# Patient Record
Sex: FEMALE | HISPANIC OR LATINO | Employment: UNEMPLOYED | URBAN - METROPOLITAN AREA
[De-identification: names, ages, dates, MRNs, and addresses within clinical notes are randomized per-mention and may not be internally consistent; named-entity substitution may affect disease eponyms.]

---

## 2023-08-30 ENCOUNTER — APPOINTMENT (OUTPATIENT)
Dept: LAB | Facility: CLINIC | Age: 28
End: 2023-08-30

## 2023-08-30 DIAGNOSIS — Z02.1 PRE-EMPLOYMENT HEALTH SCREENING EXAMINATION: ICD-10-CM

## 2023-08-30 LAB
MEV IGG SER QL IA: NORMAL
MUV IGG SER QL IA: NORMAL
RUBV IGG SERPL IA-ACNC: 48.5 IU/ML
VZV IGG SER QL IA: NORMAL

## 2023-08-30 PROCEDURE — 86787 VARICELLA-ZOSTER ANTIBODY: CPT

## 2023-08-30 PROCEDURE — 36415 COLL VENOUS BLD VENIPUNCTURE: CPT

## 2023-08-30 PROCEDURE — 86480 TB TEST CELL IMMUN MEASURE: CPT

## 2023-08-30 PROCEDURE — 86735 MUMPS ANTIBODY: CPT

## 2023-08-30 PROCEDURE — 86762 RUBELLA ANTIBODY: CPT

## 2023-08-30 PROCEDURE — 86765 RUBEOLA ANTIBODY: CPT

## 2023-09-01 LAB
GAMMA INTERFERON BACKGROUND BLD IA-ACNC: 0.07 IU/ML
M TB IFN-G BLD-IMP: NEGATIVE
M TB IFN-G CD4+ BCKGRND COR BLD-ACNC: -0.03 IU/ML
M TB IFN-G CD4+ BCKGRND COR BLD-ACNC: -0.03 IU/ML
MITOGEN IGNF BCKGRD COR BLD-ACNC: >10 IU/ML

## 2024-04-19 ENCOUNTER — OFFICE VISIT (OUTPATIENT)
Dept: URGENT CARE | Facility: CLINIC | Age: 29
End: 2024-04-19
Payer: COMMERCIAL

## 2024-04-19 VITALS
OXYGEN SATURATION: 100 % | RESPIRATION RATE: 18 BRPM | HEART RATE: 102 BPM | TEMPERATURE: 98.8 F | DIASTOLIC BLOOD PRESSURE: 80 MMHG | WEIGHT: 225 LBS | SYSTOLIC BLOOD PRESSURE: 138 MMHG

## 2024-04-19 DIAGNOSIS — R10.9 RIGHT SIDED ABDOMINAL PAIN: Primary | ICD-10-CM

## 2024-04-19 PROCEDURE — 99213 OFFICE O/P EST LOW 20 MIN: CPT | Performed by: PHYSICIAN ASSISTANT

## 2024-04-19 RX ORDER — RIZATRIPTAN BENZOATE 10 MG/1
TABLET ORAL
COMMUNITY
Start: 2024-03-07

## 2024-04-19 RX ORDER — DROSPIRENONE AND ETHINYL ESTRADIOL 0.03MG-3MG
KIT ORAL
COMMUNITY
Start: 2024-03-31

## 2024-04-19 NOTE — PATIENT INSTRUCTIONS
Right sided abdominal pain: The patient is having significant discomfort and has abdominal tenderness over the RUQ and RLQ.  We discussed ddx included appendicitis, cholecystitis, Nephrolithiasis. I would like her to go to the ED immediately for a CT Scan. She is agreeable and would like her  to drive her to [facility]. The patients vitals are stable. The ED was called and report was given.

## 2024-04-19 NOTE — PROGRESS NOTES
[facility]        NAME: Radha Thomas is a 28 y.o. female  : 1995    MRN: [id number]  DATE: 2024  TIME: 12:41 PM    Assessment and Plan   Right sided abdominal pain [R10.9]  1. Right sided abdominal pain              Patient Instructions   Right sided abdominal pain: The patient is having significant discomfort and has abdominal tenderness over the RUQ and RLQ.  We discussed ddx included appendicitis, cholecystitis, Nephrolithiasis. I would like her to go to the ED immediately for a CT Scan. She is agreeable and would like her  to drive her to [facility]. The patients vitals are stable. The ED was called and report was given.     Follow up with PCP in 3-5 days.  Proceed to  ER     If tests have been performed at [facility], our office will contact you with results if changes need to be made to the care plan discussed with you at the visit.  You can review your full results on [facility] MyChart.    Chief Complaint     Chief Complaint   Patient presents with    Abdominal Pain     Pt here  for right side  Abd pain on going  since 7 Am,    pain 7/10, some nausea.  HX of PCOS.          History of Present Illness       The patient is a 28-year-old female with a hx of PCOS who presents today for right sided abdominal pain with nausea x 5 hours. She states that her sx began last night with mild nausea, she went to work today and began to experience acute, severe pain over the Right lower quadrant that radiates around to the back. She states that the pain will come in waves. No hx of abdominal surgeries. She states that PO intake makes it worse. She has no diarrhea, vomiting, melena, hematochezia. No dizziness, weakness, cp, palpitations. She has no hematuria, urinary frequency, urgency. LMP: 4/10/24.         Review of Systems   Review of Systems   Constitutional:  Negative for activity change, appetite change, chills, fatigue and fever.   Respiratory:  Negative for cough,  chest tightness, shortness of breath and wheezing.    Cardiovascular:  Negative for chest pain and palpitations.   Gastrointestinal:  Positive for abdominal pain and nausea. Negative for abdominal distention, blood in stool, constipation, diarrhea and vomiting.   Genitourinary:  Negative for decreased urine volume, dysuria, flank pain, frequency, hematuria, menstrual problem, pelvic pain, urgency, vaginal bleeding, vaginal discharge and vaginal pain.   Musculoskeletal:  Negative for arthralgias and myalgias.   Skin:  Negative for rash.   Neurological:  Negative for dizziness and weakness.   Hematological:  Negative for adenopathy. Does not bruise/bleed easily.         Current Medications       Current Outpatient Medications:     drospirenone-ethinyl estradiol (LEONARDO) 3-0.03 MG per tablet, , Disp: , Rfl:     rizatriptan (MAXALT) 10 mg tablet, , Disp: , Rfl:     Current Allergies     Allergies as of 04/19/2024    (No Known Allergies)            The following portions of the patient's history were reviewed and updated as appropriate: allergies, current medications, past family history, past medical history, past social history, past surgical history and problem list.     Past Medical History:   Diagnosis Date    PCOS (polycystic ovarian syndrome)        Past Surgical History:   Procedure Laterality Date    WISDOM TOOTH EXTRACTION         Family History   Problem Relation Age of Onset    Seizures Father          Medications have been verified.        Objective   /80   Pulse 102   Temp 98.8 °F (37.1 °C)   Resp 18   Wt 102 kg (225 lb)   SpO2 100%   No LMP recorded.       Physical Exam     Physical Exam  Vitals and nursing note reviewed.   Constitutional:       General: She is in acute distress.      Appearance: Normal appearance. She is well-developed. She is not ill-appearing, toxic-appearing or diaphoretic.      Comments: The patient is in acute distress due to pain and is unable to stay still.     Cardiovascular:      Rate and Rhythm: Normal rate and regular rhythm. No extrasystoles are present.     Chest Wall: PMI is not displaced. No thrill.      Pulses: No decreased pulses.      Heart sounds: Normal heart sounds, S1 normal and S2 normal.      No friction rub. No gallop. No S3 or S4 sounds.   Pulmonary:      Effort: Pulmonary effort is normal.      Breath sounds: Normal breath sounds and air entry. No stridor, decreased air movement or transmitted upper airway sounds. No decreased breath sounds, wheezing, rhonchi or rales.   Abdominal:      General: Abdomen is flat. Bowel sounds are normal. There is no distension.      Palpations: Abdomen is soft. Abdomen is not rigid.      Tenderness: There is abdominal tenderness in the right upper quadrant and right lower quadrant. There is guarding. There is no right CVA tenderness, left CVA tenderness or rebound. Positive signs include Spicer's sign. Negative signs include Rovsing's sign, McBurney's sign and obturator sign.      Hernia: No hernia is present.      Comments: There is significant RUQ and RLQ tenderness on exam today with guarding. Brooks sign positive. Abdomen is soft and non-distended. Bowel sounds are normoactive in all four quadrants. No CVA tenderness.    Skin:     General: Skin is warm and dry.      Capillary Refill: Capillary refill takes less than 2 seconds.   Psychiatric:         Behavior: Behavior normal.

## 2024-04-26 ENCOUNTER — TELEPHONE (OUTPATIENT)
Age: 29
End: 2024-04-26

## 2024-04-26 NOTE — TELEPHONE ENCOUNTER
Npt called stating she's colleague Jaylyn Salazar PA-C asking if she could be seen asap she had presented [facility] [place] and [facility] ED was instructed to follow up which she's prefer seeing Jaylyn than going to [facility] [place],please forward request.

## 2024-04-29 NOTE — TELEPHONE ENCOUNTER
Pt called back and stated that she will take the appt on 5/2 at 1:40 at [facility] with Jaylyn Patrick she will schedule the appt since I don't have an access to do it.

## 2024-04-29 NOTE — TELEPHONE ENCOUNTER
Voicemail left for the patient stating Jaylyn GLASER can see her on 5/2/2024 at 140 pm at [facility] office.  Asked patient to please call the office to confirm.    Please let me know so I can schedule patient on Jaylyn's schedule.  Thanks

## 2024-04-29 NOTE — TELEPHONE ENCOUNTER
thanks yes i'm happy to see Radha personally- please see if she can come in this week can double book my 1:40 this thursday 5/2

## 2024-05-02 ENCOUNTER — OFFICE VISIT (OUTPATIENT)
Dept: UROLOGY | Facility: CLINIC | Age: 29
End: 2024-05-02
Payer: COMMERCIAL

## 2024-05-02 VITALS
SYSTOLIC BLOOD PRESSURE: 116 MMHG | HEART RATE: 82 BPM | WEIGHT: 220 LBS | DIASTOLIC BLOOD PRESSURE: 68 MMHG | OXYGEN SATURATION: 99 %

## 2024-05-02 DIAGNOSIS — R10.31 RIGHT LOWER QUADRANT PAIN: Primary | ICD-10-CM

## 2024-05-02 DIAGNOSIS — R10.9 FLANK PAIN: ICD-10-CM

## 2024-05-02 LAB
POST-VOID RESIDUAL VOLUME, ML POC: 52 ML
SL AMB  POCT GLUCOSE, UA: ABNORMAL
SL AMB LEUKOCYTE ESTERASE,UA: ABNORMAL
SL AMB POCT BILIRUBIN,UA: ABNORMAL
SL AMB POCT BLOOD,UA: ABNORMAL
SL AMB POCT CLARITY,UA: ABNORMAL
SL AMB POCT COLOR,UA: YELLOW
SL AMB POCT KETONES,UA: ABNORMAL
SL AMB POCT NITRITE,UA: ABNORMAL
SL AMB POCT PH,UA: 5
SL AMB POCT SPECIFIC GRAVITY,UA: 1.01
SL AMB POCT URINE PROTEIN: ABNORMAL
SL AMB POCT UROBILINOGEN: 0.2

## 2024-05-02 PROCEDURE — 51798 US URINE CAPACITY MEASURE: CPT | Performed by: PHYSICIAN ASSISTANT

## 2024-05-02 PROCEDURE — 81002 URINALYSIS NONAUTO W/O SCOPE: CPT | Performed by: PHYSICIAN ASSISTANT

## 2024-05-02 PROCEDURE — 99203 OFFICE O/P NEW LOW 30 MIN: CPT | Performed by: PHYSICIAN ASSISTANT

## 2024-05-02 NOTE — PROGRESS NOTES
5/2/2024      Chief Complaint   Patient presents with   • New Patient Visit     RLQ pain and flank pain          Assessment and Plan    28 y.o. female managed by     1. Right lower quadrant pain  -     POCT urine dip  -     POCT Measure PVR    2. Flank pain  -     POCT urine dip  -     POCT Measure PVR      Reviewed her ER and primary records with her today.  2 CAT scans, urine testing.  She is managing her symptoms with ibuprofen, she already completed a course of Bactrim, nausea vomiting diarrhea has resolved.  Unclear cause of her acute GI and  symptoms.  I question if this is part of a viral syndrome.  She seems to be at the tail end of symptoms improving with conservative measures.  I do not think that this represents UTI nor stone.  She will let me know how she is feeling in the next few weeks.      History of Present Illness  Radha Thomas is a 28 y.o. female here for evaluation of abdominal pain.  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    2 weeks of acute right flank pain radiating to the right lower quadrant initially accompanied by nausea and vomiting.  Diarrhea ensued and was pretty severe for a week after that.  GI symptoms have subsided.  Still gets sharp pains into the right flank at times possibly radiates toward her bladder, has no hematuria dysuria urgency.  Pain seems unrelated by food or urination.  She has no appetite has been trying brat diet.  Initial CT scan in the emergency department showed no stone no hydronephrosis no appendicitis no ovarian concerns.  There were dilated bowel loops more consistent with an enteritis.  She was prescribed Zofran and later Bactrim for some trace blood and leukocytes on urinalysis-which she completed.  Culture was negative.  She had a follow-up CT stone study which again showed no stone or hydronephrosis.  There is some general lymphadenopathy I question if this is all part of a viral syndrome.        Review of Systems    Constitutional: Negative.    Respiratory: Negative.     Cardiovascular: Negative.    Gastrointestinal:  Positive for abdominal pain, diarrhea, nausea and vomiting. Negative for abdominal distention and constipation.   Genitourinary:  Positive for flank pain. Negative for decreased urine volume, difficulty urinating, dysuria, frequency, hematuria and urgency.                Vitals  Vitals:    05/02/24 1353   BP: 116/68   BP Location: Left arm   Patient Position: Sitting   Cuff Size: Large   Pulse: 82   SpO2: 99%   Weight: 99.8 kg (220 lb)       Physical Exam  Vitals and nursing note reviewed.   Constitutional:       General: She is not in acute distress.     Appearance: Normal appearance. She is well-developed. She is not diaphoretic.   HENT:      Head: Normocephalic and atraumatic.   Pulmonary:      Effort: Pulmonary effort is normal.   Abdominal:      General: Abdomen is flat.      Palpations: Abdomen is soft. There is no mass.      Tenderness: There is no abdominal tenderness. There is no right CVA tenderness or left CVA tenderness.   Musculoskeletal:      Right lower leg: No edema.      Left lower leg: No edema.   Skin:     General: Skin is warm and dry.   Neurological:      General: No focal deficit present.      Mental Status: She is alert and oriented to person, place, and time.   Psychiatric:         Mood and Affect: Mood normal.         Speech: Speech normal.         Behavior: Behavior normal.           Past History  Past Medical History:   Diagnosis Date   • PCOS (polycystic ovarian syndrome)      Social History     Socioeconomic History   • Marital status: /Civil Union     Spouse name: None   • Number of children: None   • Years of education: None   • Highest education level: None   Occupational History   • None   Tobacco Use   • Smoking status: Never   • Smokeless tobacco: Never   Vaping Use   • Vaping status: Never Used   Substance and Sexual Activity   • Alcohol use: Yes   • Drug use: Not  "Currently   • Sexual activity: None   Other Topics Concern   • None   Social History Narrative   • None     Social Determinants of Health     Financial Resource Strain: Not on file   Food Insecurity: Not on file   Transportation Needs: Not on file   Physical Activity: Not on file   Stress: Not on file   Social Connections: Not on file   Intimate Partner Violence: Not on file   Housing Stability: Not on file     Social History     Tobacco Use   Smoking Status Never   Smokeless Tobacco Never     Family History   Problem Relation Age of Onset   • Seizures Father        The following portions of the patient's history were reviewed and updated as appropriate: allergies, current medications, past medical history, past social history, past surgical history and problem list.    Results  No results found for this or any previous visit (from the past 1 hour(s)).  ]  No results found for: \"PSA\"  No results found for: \"GLUCOSE\", \"CALCIUM\", \"NA\", \"K\", \"CO2\", \"CL\", \"BUN\", \"CREATININE\"  No results found for: \"WBC\", \"HGB\", \"HCT\", \"MCV\", \"PLT\"    "

## 2024-10-30 ENCOUNTER — TELEPHONE (OUTPATIENT)
Age: 29
End: 2024-10-30

## 2024-10-30 NOTE — TELEPHONE ENCOUNTER
Patient returning call and only can go to [facility] office  She will call back to check on the schedule to see if we get any availability

## 2024-12-04 ENCOUNTER — DOCUMENTATION (OUTPATIENT)
Dept: DERMATOLOGY | Facility: CLINIC | Age: 29
End: 2024-12-04

## 2024-12-04 DIAGNOSIS — L81.1 MELASMA: Primary | ICD-10-CM

## 2024-12-04 RX ORDER — HYDROQUINONE 40 MG/G
CREAM TOPICAL
Qty: 30 G | Refills: 3 | Status: SHIPPED | OUTPATIENT
Start: 2024-12-04

## 2024-12-04 RX ORDER — TRETINOIN 0.25 MG/G
CREAM TOPICAL
Qty: 30 G | Refills: 3 | Status: SHIPPED | OUTPATIENT
Start: 2024-12-04

## 2024-12-04 NOTE — PROGRESS NOTES
Seen at [facility], sent in Rx for HQ and tranexemic acid tretinoin containing creams to alternate every two months for melasma.

## 2025-01-10 NOTE — PROGRESS NOTES
"Subjective      Radha Thomas is a 29 y.o. female who presents for annual GYN exam.     GYN:  Menses are getting more regular! They last for 5-6 days. She reports that her periods were really rough after discontinuing OCPs (heavy and irregular) but improving now!  However, they have regulated more in the past 3 months. BUT, her period is now 1 week late and UPT was negative in the office today. She has a history of issues with her periods. She was on birth control from a young age and then discontinued but did not get her cycle for 1 year. She was diagnosed with PCOS by ultrasound previously (cysts on both ovaries). She was subsequently restarted on OCPs  She is transferring care from [facility] to 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   She denies vaginal discharge, labial erythema or lesions, dyspareulenia.  Contraception: None currenlty; They are not preventing pregnancy but are also not actively trying. \"If it happens, it happens\"   Patient is sexually active with her .     OB:  OB History    Para Term  AB Living   0 0 0 0 0 0   SAB IAB Ectopic Multiple Live Births   0 0 0 0 0     :  She denies dysuria, urinary frequency or urgency.  She denies hematuria, flank pain, incontinence.    Breast:  She denie breast mass, skin changes, dimpling, reddening, nipple retraction.  She denies breast discharge.  She had significant left breast pain a few months ago. She completed an ultrasound that was normal and her pain has resolved!  Patient does not have a family history of breast, endometrial, colon, or ovarian ca.     Past Medical History:   Diagnosis Date    Migraine     PCOS (polycystic ovarian syndrome)        Past Surgical History:   Procedure Laterality Date    WISDOM TOOTH EXTRACTION       General:  Diet: Good  Work: PT at [employer]  Safety: Yes, safe at home    Social History     Tobacco Use    Smoking status: Never    Smokeless tobacco: Never   Vaping Use    Vaping status: Never Used "   Substance Use Topics    Alcohol use: Yes     Alcohol/week: 2.0 standard drinks of alcohol     Types: 2 Glasses of wine per week     Comment: social    Drug use: Not Currently       Screening:  Cervical cancer: last pap smear was 2 years ago. Results were nromal. She denies abnormal pap tests . Patient has received Gardasil vaccine.   Breast cancer: Not yet indicated - had US  Colon cancer: She has had colonsopy in 4/2024 - lost 20lbs, N/V of unknown cause; couldn't eat; slowly resolved; inflmmationn but biopsies were normal; stress  STD screening: None .toghter since 17yo!     Review of Systems   Constitutional:  Negative for appetite change, chills and fever.   HENT:  Negative for trouble swallowing.    Respiratory:  Negative for shortness of breath.    Cardiovascular:  Negative for chest pain.   Gastrointestinal:  Negative for abdominal pain, constipation, diarrhea, nausea and vomiting.   Genitourinary:  Negative for decreased urine volume, difficulty urinating, dyspareunia, dysuria, flank pain, frequency, genital sores, hematuria, menstrual problem, pelvic pain, urgency, vaginal bleeding, vaginal discharge and vaginal pain.          Objective      /70 (BP Location: Left arm, Patient Position: Sitting, Cuff Size: Large)   Wt 112 kg (248 lb)   LMP 12/11/2024 (Within Days)   Physical Exam  Vitals reviewed.   Constitutional:       General: She is not in acute distress.     Appearance: Normal appearance. She is well-developed. She is not ill-appearing, toxic-appearing or diaphoretic.   Neck:      Thyroid: No thyroid mass, thyromegaly or thyroid tenderness.   Cardiovascular:      Rate and Rhythm: Normal rate and regular rhythm.      Heart sounds: Normal heart sounds. No murmur heard.     No friction rub. No gallop.   Pulmonary:      Effort: Pulmonary effort is normal. No respiratory distress.      Breath sounds: Normal breath sounds. No stridor. No wheezing, rhonchi or rales.   Chest:      Chest wall: No  tenderness.   Breasts:     Breasts are symmetrical.      Right: No swelling, bleeding, inverted nipple, mass, nipple discharge, skin change or tenderness.      Left: No swelling, bleeding, inverted nipple, mass, nipple discharge, skin change or tenderness.   Abdominal:      General: There is no distension.      Palpations: Abdomen is soft.      Tenderness: There is no abdominal tenderness.   Genitourinary:     General: Normal vulva.      Exam position: Lithotomy position.      Labia:         Right: No rash, tenderness, lesion or injury.         Left: No rash, tenderness, lesion or injury.       Urethra: No prolapse or urethral lesion.      Vagina: Normal. No signs of injury and foreign body. No vaginal discharge, erythema, tenderness, bleeding, lesions or prolapsed vaginal walls.      Cervix: No cervical motion tenderness, discharge, friability, lesion, erythema, cervical bleeding or eversion.      Uterus: Not deviated, not enlarged, not fixed, not tender and no uterine prolapse.       Adnexa:         Right: No mass, tenderness or fullness.          Left: No mass, tenderness or fullness.        Rectum: No external hemorrhoid.   Lymphadenopathy:      Cervical: No cervical adenopathy.      Upper Body:      Right upper body: No supraclavicular, axillary or pectoral adenopathy.      Left upper body: No supraclavicular, axillary or pectoral adenopathy.   Skin:     General: Skin is warm and dry.   Neurological:      Mental Status: She is alert and oriented to person, place, and time.   Psychiatric:         Behavior: Behavior normal. Behavior is cooperative.                 Assessment & Plan  Encounter for annual routine gynecological examination  No GYN concerns today  Birth control: Declined  Cervical cancer screening: Collected today  Breast Cancer screening: Due age 40  Colon cancer Screening: Due age 45  STD screening: Declined  Reviewed healthy lifestyle and safe sex practices  RTO for annual exam or PRN        Screening for cervical cancer    Orders:    Liquid-based pap, screening    PCOS (polycystic ovarian syndrome)  Patient previously diagnosed with PCOS. I have counseled the patient on the risks of chronic anovulation, including the risk of endometrial hyperplasia as well as endometrial cancer.  I recommend that the patient ensure that she does not have amenorrhea for more than 3 months for endometrial protection. She will monitor her cycles and call if she goes more than 3 months without a period. UPT negative today.        Menstrual period late    Orders:    POCT urine HCG        Jada Burns MD  OB/GYN  1/16/2025  12:43 PM

## 2025-01-16 ENCOUNTER — OFFICE VISIT (OUTPATIENT)
Dept: OBGYN CLINIC | Facility: CLINIC | Age: 30
End: 2025-01-16
Payer: COMMERCIAL

## 2025-01-16 VITALS — SYSTOLIC BLOOD PRESSURE: 112 MMHG | DIASTOLIC BLOOD PRESSURE: 70 MMHG | WEIGHT: 248 LBS

## 2025-01-16 DIAGNOSIS — N92.6 MENSTRUAL PERIOD LATE: ICD-10-CM

## 2025-01-16 DIAGNOSIS — Z01.419 ENCOUNTER FOR ANNUAL ROUTINE GYNECOLOGICAL EXAMINATION: Primary | ICD-10-CM

## 2025-01-16 DIAGNOSIS — Z12.4 SCREENING FOR CERVICAL CANCER: ICD-10-CM

## 2025-01-16 DIAGNOSIS — E28.2 PCOS (POLYCYSTIC OVARIAN SYNDROME): ICD-10-CM

## 2025-01-16 LAB — SL AMB POCT URINE HCG: NORMAL

## 2025-01-16 PROCEDURE — S0610 ANNUAL GYNECOLOGICAL EXAMINA: HCPCS | Performed by: OBSTETRICS & GYNECOLOGY

## 2025-01-16 PROCEDURE — 81025 URINE PREGNANCY TEST: CPT | Performed by: OBSTETRICS & GYNECOLOGY

## 2025-01-16 PROCEDURE — G0145 SCR C/V CYTO,THINLAYER,RESCR: HCPCS | Performed by: OBSTETRICS & GYNECOLOGY

## 2025-01-22 ENCOUNTER — RESULTS FOLLOW-UP (OUTPATIENT)
Dept: OBGYN CLINIC | Facility: CLINIC | Age: 30
End: 2025-01-22

## 2025-01-22 LAB
LAB AP GYN PRIMARY INTERPRETATION: NORMAL
Lab: NORMAL

## 2025-02-06 DIAGNOSIS — N91.2 AMENORRHEA: Primary | ICD-10-CM

## 2025-02-06 DIAGNOSIS — E28.2 PCOS (POLYCYSTIC OVARIAN SYNDROME): ICD-10-CM

## 2025-02-06 RX ORDER — MEDROXYPROGESTERONE ACETATE 5 MG
5 TABLET ORAL DAILY
Qty: 10 TABLET | Refills: 0 | Status: SHIPPED | OUTPATIENT
Start: 2025-02-06 | End: 2025-02-16

## 2025-02-06 NOTE — TELEPHONE ENCOUNTER
The absence is likely secondary to known diagnosis of PCOS. I will ordered labs for her to complete and an ultrasound. She should also take Provera x 10 days and this will cause a bleed. She should complete her labs and then take the Provera then complete the US. Patient should be scheduled in the office to discuss long term management of her PCOS. Thanks!

## 2025-02-07 ENCOUNTER — APPOINTMENT (OUTPATIENT)
Dept: LAB | Facility: CLINIC | Age: 30
End: 2025-02-07
Payer: COMMERCIAL

## 2025-02-07 DIAGNOSIS — N91.2 AMENORRHEA: ICD-10-CM

## 2025-02-07 DIAGNOSIS — E28.2 PCOS (POLYCYSTIC OVARIAN SYNDROME): ICD-10-CM

## 2025-02-07 LAB
B-HCG SERPL-ACNC: <0.6 MIU/ML (ref 0–5)
FSH SERPL-ACNC: 4.7 MIU/ML
PROLACTIN SERPL-MCNC: 6.12 NG/ML (ref 3.34–26.72)
TESTOST SERPL-MSCNC: 52 NG/DL (ref 0–75)
TSH SERPL DL<=0.05 MIU/L-ACNC: 1.38 UIU/ML (ref 0.45–4.5)

## 2025-02-07 PROCEDURE — 84702 CHORIONIC GONADOTROPIN TEST: CPT

## 2025-02-07 PROCEDURE — 83001 ASSAY OF GONADOTROPIN (FSH): CPT

## 2025-02-07 PROCEDURE — 36415 COLL VENOUS BLD VENIPUNCTURE: CPT

## 2025-02-07 PROCEDURE — 84146 ASSAY OF PROLACTIN: CPT

## 2025-02-07 PROCEDURE — 84403 ASSAY OF TOTAL TESTOSTERONE: CPT

## 2025-02-07 PROCEDURE — 84443 ASSAY THYROID STIM HORMONE: CPT

## 2025-02-10 ENCOUNTER — RESULTS FOLLOW-UP (OUTPATIENT)
Dept: OBGYN CLINIC | Facility: CLINIC | Age: 30
End: 2025-02-10

## 2025-02-10 NOTE — RESULT ENCOUNTER NOTE
FSH, TSH, PRL, HCG, Testosterone WNL.   Amenorrhea likely secondary to PCOS.   Now that she is s/p labs. Recommended taking the Provera 5mg x10 days as prescribed.   Patient encouraged to complete US after withdrawal bleed.   Please contact patient to schedule appointment for further management discussion.     Thanks!     Jada Hess MD  OB/GYN  She/her  2/10/2025  4:51 PM

## 2025-02-12 NOTE — TELEPHONE ENCOUNTER
Called patient and left non-detailed voicemail requesting she call the office back at [phone].

## 2025-03-11 ENCOUNTER — HOSPITAL ENCOUNTER (OUTPATIENT)
Dept: RADIOLOGY | Facility: HOSPITAL | Age: 30
Discharge: HOME | End: 2025-03-11
Attending: OBSTETRICS & GYNECOLOGY
Payer: COMMERCIAL

## 2025-03-11 DIAGNOSIS — E28.2 PCOS (POLYCYSTIC OVARIAN SYNDROME): ICD-10-CM

## 2025-03-11 DIAGNOSIS — N91.2 AMENORRHEA: ICD-10-CM

## 2025-03-11 PROCEDURE — 76830 TRANSVAGINAL US NON-OB: CPT

## 2025-03-11 PROCEDURE — 76856 US EXAM PELVIC COMPLETE: CPT

## 2025-03-13 ENCOUNTER — RESULTS FOLLOW-UP (OUTPATIENT)
Dept: LABOR AND DELIVERY | Facility: HOSPITAL | Age: 30
End: 2025-03-13

## 2025-04-28 ENCOUNTER — APPOINTMENT (OUTPATIENT)
Dept: LAB | Facility: CLINIC | Age: 30
End: 2025-04-28

## 2025-04-28 DIAGNOSIS — Z00.6 ENCOUNTER FOR EXAMINATION FOR NORMAL COMPARISON OR CONTROL IN CLINICAL RESEARCH PROGRAM: ICD-10-CM

## 2025-04-28 PROCEDURE — 36415 COLL VENOUS BLD VENIPUNCTURE: CPT

## 2025-05-06 LAB
APOB+LDLR+PCSK9 GENE MUT ANL BLD/T: NOT DETECTED
BRCA1+BRCA2 DEL+DUP + FULL MUT ANL BLD/T: NOT DETECTED
MLH1+MSH2+MSH6+PMS2 GN DEL+DUP+FUL M: NOT DETECTED

## 2025-08-18 ENCOUNTER — PATIENT MESSAGE (OUTPATIENT)
Dept: OBGYN CLINIC | Facility: CLINIC | Age: 30
End: 2025-08-18

## 2025-08-19 ENCOUNTER — TELEPHONE (OUTPATIENT)
Age: 30
End: 2025-08-19

## 2025-08-25 PROBLEM — R10.9 RIGHT SIDED ABDOMINAL PAIN: Status: RESOLVED | Noted: 2024-04-19 | Resolved: 2025-08-25

## 2025-08-25 PROBLEM — E28.2 POLYCYSTIC OVARY SYNDROME: Status: ACTIVE | Noted: 2023-12-15

## 2025-08-26 PROBLEM — E66.813 CLASS 3 SEVERE OBESITY DUE TO EXCESS CALORIES WITH SERIOUS COMORBIDITY AND BODY MASS INDEX (BMI) OF 40.0 TO 44.9 IN ADULT: Status: ACTIVE | Noted: 2025-08-26
